# Patient Record
Sex: FEMALE | Race: WHITE | NOT HISPANIC OR LATINO | URBAN - METROPOLITAN AREA
[De-identification: names, ages, dates, MRNs, and addresses within clinical notes are randomized per-mention and may not be internally consistent; named-entity substitution may affect disease eponyms.]

---

## 2018-12-25 ENCOUNTER — EMERGENCY (EMERGENCY)
Facility: HOSPITAL | Age: 58
LOS: 0 days | Discharge: HOME | End: 2018-12-25
Attending: EMERGENCY MEDICINE | Admitting: EMERGENCY MEDICINE
Payer: COMMERCIAL

## 2018-12-25 VITALS
HEART RATE: 85 BPM | OXYGEN SATURATION: 94 % | TEMPERATURE: 98 F | RESPIRATION RATE: 16 BRPM | DIASTOLIC BLOOD PRESSURE: 71 MMHG | SYSTOLIC BLOOD PRESSURE: 158 MMHG

## 2018-12-25 VITALS — HEIGHT: 65 IN | WEIGHT: 160.06 LBS

## 2018-12-25 DIAGNOSIS — R05 COUGH: ICD-10-CM

## 2018-12-25 DIAGNOSIS — J02.9 ACUTE PHARYNGITIS, UNSPECIFIED: ICD-10-CM

## 2018-12-25 DIAGNOSIS — B34.9 VIRAL INFECTION, UNSPECIFIED: ICD-10-CM

## 2018-12-25 DIAGNOSIS — R50.9 FEVER, UNSPECIFIED: ICD-10-CM

## 2018-12-25 DIAGNOSIS — Z88.0 ALLERGY STATUS TO PENICILLIN: ICD-10-CM

## 2018-12-25 DIAGNOSIS — I10 ESSENTIAL (PRIMARY) HYPERTENSION: ICD-10-CM

## 2018-12-25 PROCEDURE — 99283 EMERGENCY DEPT VISIT LOW MDM: CPT

## 2018-12-25 RX ORDER — DEXAMETHASONE 0.5 MG/5ML
10 ELIXIR ORAL ONCE
Qty: 0 | Refills: 0 | Status: COMPLETED | OUTPATIENT
Start: 2018-12-25 | End: 2018-12-25

## 2018-12-25 RX ADMIN — Medication 10 MILLIGRAM(S): at 18:21

## 2018-12-25 NOTE — ED ADULT TRIAGE NOTE - CHIEF COMPLAINT QUOTE
c/o cough, sore throat x 10 days, reports right eye redness since today. Denies fever, chills, CP, SOB.

## 2018-12-25 NOTE — ED STATDOCS - OBJECTIVE STATEMENT
57 yo F hx of HTN, presents with CC cough.  Symptoms x 1 week.  C/o dry cough, subjective fevers, "lost my voice", sore throat.  Denies vomiting, diarrhea, myalgias.  Yesterday and today has also had bilateral red eyes, with itchy, watery discharge.  No sick contacts.  No other concerns.

## 2018-12-25 NOTE — ED ADULT NURSE NOTE - NSIMPLEMENTINTERV_GEN_ALL_ED
Implemented All Universal Safety Interventions:  [place] to call system. Call bell, personal items and telephone within reach. Instruct patient to call for assistance. Room bathroom lighting operational. Non-slip footwear when patient is off stretcher. Physically safe environment: no spills, clutter or unnecessary equipment. Stretcher in lowest position, wheels locked, appropriate side rails in place.

## 2019-02-27 ENCOUNTER — APPOINTMENT (OUTPATIENT)
Dept: DERMATOLOGY | Facility: CLINIC | Age: 59
End: 2019-02-27
Payer: COMMERCIAL

## 2019-02-27 VITALS — WEIGHT: 160 LBS | BODY MASS INDEX: 26.66 KG/M2 | HEIGHT: 65 IN

## 2019-02-27 DIAGNOSIS — L81.4 OTHER MELANIN HYPERPIGMENTATION: ICD-10-CM

## 2019-02-27 DIAGNOSIS — D22.9 MELANOCYTIC NEVI, UNSPECIFIED: ICD-10-CM

## 2019-02-27 DIAGNOSIS — D48.9 NEOPLASM OF UNCERTAIN BEHAVIOR, UNSPECIFIED: ICD-10-CM

## 2019-02-27 PROCEDURE — 11102 TANGNTL BX SKIN SINGLE LES: CPT

## 2019-02-27 PROCEDURE — 99203 OFFICE O/P NEW LOW 30 MIN: CPT | Mod: 25

## 2019-02-27 RX ORDER — SODIUM FLUORIDE 6 MG/ML
1.1 PASTE, DENTIFRICE DENTAL
Qty: 100 | Refills: 0 | Status: ACTIVE | COMMUNITY
Start: 2018-10-20

## 2019-02-27 RX ORDER — IBUPROFEN 600 MG/1
600 TABLET, FILM COATED ORAL
Qty: 20 | Refills: 0 | Status: DISCONTINUED | COMMUNITY
Start: 2018-11-01

## 2019-02-27 RX ORDER — CLINDAMYCIN HYDROCHLORIDE 150 MG/1
150 CAPSULE ORAL
Qty: 20 | Refills: 0 | Status: DISCONTINUED | COMMUNITY
Start: 2018-11-01

## 2019-02-27 RX ORDER — CHLORHEXIDINE GLUCONATE, 0.12% ORAL RINSE 1.2 MG/ML
0.12 SOLUTION DENTAL
Qty: 473 | Refills: 0 | Status: DISCONTINUED | COMMUNITY
Start: 2018-11-01

## 2019-02-27 RX ORDER — LISINOPRIL AND HYDROCHLOROTHIAZIDE TABLETS 20; 12.5 MG/1; MG/1
20-12.5 TABLET ORAL
Qty: 180 | Refills: 0 | Status: DISCONTINUED | COMMUNITY
Start: 2018-02-15

## 2019-02-27 RX ORDER — HYDROCODONE BITARTRATE AND HOMATROPINE METHYLBROMIDE 5; 1.5 MG/5ML; MG/5ML
5-1.5 SYRUP ORAL
Qty: 80 | Refills: 0 | Status: DISCONTINUED | COMMUNITY
Start: 2018-12-25

## 2019-02-27 NOTE — HISTORY OF PRESENT ILLNESS
[FreeTextEntry1] : growth on left cheek [de-identified] : patient with mole on left cheek. feels it was present but is now regressing.

## 2019-02-27 NOTE — ASSESSMENT
[FreeTextEntry1] : 1) benign findings as above- education\par \par 2) Shave bx location left cheek\par diagnosis: rule out IDN vs. halo nevus \par  \par Shave biopsy performed today over above location, risks and benefits discussed including incomplete removal, not enough tissue for diagnosis scarring and infection, informed consent obtained, pictures taken,  cleaned with alcohol and anesthetized with 1%lido+epi, 0.3 cc total, hemostasis obtained with monsels, vaseline and bandaid placed, tolerated well, wound care reviewed, specimen sent to pathology.\par \par

## 2019-02-27 NOTE — PHYSICAL EXAM
[FreeTextEntry3] : AAOx3, pleasant, NAD, no visual lymphadenopathy\par hair, scalp, face, nose, eyelids, ears, lips, oropharynx, neck, chest, abdomen, back, right arm, left arm, nails, and hands examined with all normal findings,\par pertinent findings include:\par \par multiple benign nevi and lentigines\par left cheek with hyperpigmented papule

## 2019-03-05 ENCOUNTER — MOBILE ON CALL (OUTPATIENT)
Age: 59
End: 2019-03-05

## 2019-03-12 LAB — CORE LAB BIOPSY: NORMAL

## 2021-12-31 ENCOUNTER — NON-APPOINTMENT (OUTPATIENT)
Age: 61
End: 2021-12-31

## 2022-03-25 ENCOUNTER — NON-APPOINTMENT (OUTPATIENT)
Age: 62
End: 2022-03-25

## 2022-03-25 ENCOUNTER — APPOINTMENT (OUTPATIENT)
Dept: FAMILY MEDICINE | Facility: CLINIC | Age: 62
End: 2022-03-25
Payer: COMMERCIAL

## 2022-03-25 ENCOUNTER — LABORATORY RESULT (OUTPATIENT)
Age: 62
End: 2022-03-25

## 2022-03-25 VITALS
HEIGHT: 65 IN | RESPIRATION RATE: 16 BRPM | TEMPERATURE: 96.2 F | DIASTOLIC BLOOD PRESSURE: 90 MMHG | OXYGEN SATURATION: 98 % | BODY MASS INDEX: 30.99 KG/M2 | WEIGHT: 186 LBS | HEART RATE: 80 BPM | SYSTOLIC BLOOD PRESSURE: 124 MMHG

## 2022-03-25 DIAGNOSIS — Z78.9 OTHER SPECIFIED HEALTH STATUS: ICD-10-CM

## 2022-03-25 DIAGNOSIS — Z82.0 FAMILY HISTORY OF EPILEPSY AND OTHER DISEASES OF THE NERVOUS SYSTEM: ICD-10-CM

## 2022-03-25 DIAGNOSIS — Z87.891 PERSONAL HISTORY OF NICOTINE DEPENDENCE: ICD-10-CM

## 2022-03-25 DIAGNOSIS — Z82.49 FAMILY HISTORY OF ISCHEMIC HEART DISEASE AND OTHER DISEASES OF THE CIRCULATORY SYSTEM: ICD-10-CM

## 2022-03-25 DIAGNOSIS — Z80.3 FAMILY HISTORY OF MALIGNANT NEOPLASM OF BREAST: ICD-10-CM

## 2022-03-25 DIAGNOSIS — Z86.32 PERSONAL HISTORY OF GESTATIONAL DIABETES: ICD-10-CM

## 2022-03-25 DIAGNOSIS — Z83.438 FAMILY HISTORY OF OTHER DISORDER OF LIPOPROTEIN METABOLISM AND OTHER LIPIDEMIA: ICD-10-CM

## 2022-03-25 PROCEDURE — 93000 ELECTROCARDIOGRAM COMPLETE: CPT | Mod: 59

## 2022-03-25 PROCEDURE — 99386 PREV VISIT NEW AGE 40-64: CPT | Mod: 25

## 2022-03-25 PROCEDURE — G0444 DEPRESSION SCREEN ANNUAL: CPT | Mod: 59

## 2022-03-25 PROCEDURE — 36415 COLL VENOUS BLD VENIPUNCTURE: CPT

## 2022-03-25 RX ORDER — BUPROPION HYDROCHLORIDE 150 MG/1
150 TABLET, EXTENDED RELEASE ORAL
Qty: 180 | Refills: 0 | Status: DISCONTINUED | COMMUNITY
Start: 2017-10-29 | End: 2022-03-25

## 2022-03-25 RX ORDER — SIMVASTATIN 10 MG/1
10 TABLET, FILM COATED ORAL
Qty: 30 | Refills: 0 | Status: DISCONTINUED | COMMUNITY
Start: 2018-10-27 | End: 2022-03-25

## 2022-03-25 NOTE — PLAN
[FreeTextEntry1] : Reviewed age-appropriate preventive screening tests with patient. UTD on mammogram. Due for gyn exam, DEXA, CRC screening (colonoscopy vs Cologuard) and agrees to consider these but is not ready to schedule at this time.  \par \par Shingrix, flu revd/recommended. She defers for now but will consider for the future.\par \par Check labs today. Continue same meds/doses pending results. BP goal is <=135/85 on average on home checks. \par \par Reviewed she should consider cardiology eval and testing given her cardiac RFs and as testing results would help to guide LDL goal for her. She will consider but defers for now. ECG show slow voltage (likely artifact due to breast tissue)\par \par Discussed clean eating (eg Mediterranean style eating plan) and regular exercise/staying as physically active as possible.  Include balance exercises and strength training and core strengthening exercises for bone health and to decrease risk for falls. \par \par Reviewed importance of good self care (eg meditation, yoga, adequate rest, regular exercise, magnesium, clean eating etc).\par \par r/b/se Alprazolam revd and recommended she cont to limit use to rare/occas as she has been. RF sent as requested. She does not feel she needs a daily med at this time but can let us know if this changes in the future\par \par Next CPE in 1 yr. RPA visit (chol, HTN etc) in 6 months and rpt fasting labs at that time.

## 2022-03-25 NOTE — HISTORY OF PRESENT ILLNESS
[de-identified] : Her last PE was in 5170-7746\par \par Her last tetanus shot was in 2014 when grandchild was born\par Shingrix- never\par Has had COVID vacc x 3 \par Has not had flu vacc this season\par \par Her last dentist visit was within past 6 months\par Her last eye doctor appointment was within past year\par Her last dermatologist visit was in 2019 Dr. Moore\par \par Her diet is clean/healthful overall\par Exercises regularly usually but is off track past few months due to busy life changes and will work to get back on track\par \par Her last colonoscopy was never, willing to do Cologuard at some time but not yet\par Her last mammogram was 1/2022 wnl per pt; has fibrocystic breasts all her life and it is stressful/anxiety provoking to do self breast exam so would like me to do breast exam at her visit today. \par Her last DEXA was never\par Her last gyn exam was in past year or two, not yet ready to repeat . \par \par Ms. Mccloud has h/o high cholesterol, HTN, anxiety/depression.\par \par She takes her meds consistently and tolerates them well. Checking BPs at home is very stressful so does not do. Was on simvastatin in the past but did not like taking statin med so stopped med and last couple of checks she thinks chol levels were good without sttain med\par \par She is doing well with clean eating but off track with her usual regular exercise routine due to various life stressors (daughter and grandchildren moved in with her  and her in past few mos).\par \par She had Echo several years ago and was wnl (due to change seen on ECG at PE exam ). Her daughter sees Dr. Blake Young for card eval. \par \par Anxiety/depression: Takes Buproprion SR form as prefers to take med BID and hopes to wean down over time. Takes ALprazolam rarely but needs RF as has only 1 pill left 12/2020/.

## 2022-03-25 NOTE — HEALTH RISK ASSESSMENT
[0] : 2) Feeling down, depressed, or hopeless: Not at all (0) [PHQ-2 Negative - No further assessment needed] : PHQ-2 Negative - No further assessment needed [[id number]] : 0

## 2022-03-25 NOTE — PHYSICAL EXAM
[No Acute Distress] : no acute distress [Well Developed] : well developed [Well-Appearing] : well-appearing [Normal Sclera/Conjunctiva] : normal sclera/conjunctiva [EOMI] : extraocular movements intact [Normal Outer Ear/Nose] : the outer ears and nose were normal in appearance [No JVD] : no jugular venous distention [No Lymphadenopathy] : no lymphadenopathy [Supple] : supple [Thyroid Normal, No Nodules] : the thyroid was normal and there were no nodules present [No Respiratory Distress] : no respiratory distress  [No Accessory Muscle Use] : no accessory muscle use [Normal Rate] : normal rate  [Clear to Auscultation] : lungs were clear to auscultation bilaterally [Regular Rhythm] : with a regular rhythm [Normal S1, S2] : normal S1 and S2 [No Carotid Bruits] : no carotid bruits [No Murmur] : no murmur heard [No Varicosities] : no varicosities [Pedal Pulses Present] : the pedal pulses are present [No Edema] : there was no peripheral edema [No Extremity Clubbing/Cyanosis] : no extremity clubbing/cyanosis [Soft] : abdomen soft [Non Tender] : non-tender [Non-distended] : non-distended [No HSM] : no HSM [Normal Bowel Sounds] : normal bowel sounds [Normal Posterior Cervical Nodes] : no posterior cervical lymphadenopathy [Normal Anterior Cervical Nodes] : no anterior cervical lymphadenopathy [No Joint Swelling] : no joint swelling [Grossly Normal Strength/Tone] : grossly normal strength/tone [No Rash] : no rash [Coordination Grossly Intact] : coordination grossly intact [No Focal Deficits] : no focal deficits [Normal Gait] : normal gait [Normal Affect] : the affect was normal [Normal Insight/Judgement] : insight and judgment were intact [Normal Appearance] : normal in appearance [No Masses] : no palpable masses [No Nipple Discharge] : no nipple discharge [No Axillary Lymphadenopathy] : no axillary lymphadenopathy [de-identified] : overweight, mildly anxious affect at start of visit but calmer/at ease by end of visit, good eye contact  [de-identified] : B pendulous breasts with fibrocystic changes symmetric and stable (this is her typical baseline exam), CBE done at her request today

## 2022-03-25 NOTE — REVIEW OF SYSTEMS
[Negative] : Heme/Lymph [de-identified] : h/o tick bites occas in past year, also her dog had Lyme disease and so did  in past year so she would like Lyme test with labs

## 2022-03-25 NOTE — ASSESSMENT
[FreeTextEntry1] : LONNIE LERMA is a 62 year old female here for a physical exam.  She is also here to follow up on medical issues as noted above.\par

## 2022-03-27 LAB
ALBUMIN SERPL ELPH-MCNC: 4.7 G/DL
ALP BLD-CCNC: 138 U/L
ALT SERPL-CCNC: 39 U/L
ANION GAP SERPL CALC-SCNC: 15 MMOL/L
AST SERPL-CCNC: 29 U/L
BILIRUB SERPL-MCNC: 0.3 MG/DL
BUN SERPL-MCNC: 29 MG/DL
CALCIUM SERPL-MCNC: 10.4 MG/DL
CHLORIDE SERPL-SCNC: 101 MMOL/L
CHOLEST SERPL-MCNC: 283 MG/DL
CO2 SERPL-SCNC: 24 MMOL/L
CREAT SERPL-MCNC: 1.26 MG/DL
EGFR: 48 ML/MIN/1.73M2
ESTIMATED AVERAGE GLUCOSE: 126 MG/DL
GLUCOSE SERPL-MCNC: 131 MG/DL
HBA1C MFR BLD HPLC: 6 %
HDLC SERPL-MCNC: 76 MG/DL
LDLC SERPL CALC-MCNC: 169 MG/DL
NONHDLC SERPL-MCNC: 207 MG/DL
POTASSIUM SERPL-SCNC: 4.9 MMOL/L
PROT SERPL-MCNC: 8 G/DL
SODIUM SERPL-SCNC: 140 MMOL/L
T4 FREE SERPL-MCNC: 0.9 NG/DL
TRIGL SERPL-MCNC: 188 MG/DL
TSH SERPL-ACNC: 3.14 UIU/ML

## 2022-03-30 ENCOUNTER — NON-APPOINTMENT (OUTPATIENT)
Age: 62
End: 2022-03-30

## 2022-09-11 ENCOUNTER — NON-APPOINTMENT (OUTPATIENT)
Age: 62
End: 2022-09-11

## 2022-09-13 ENCOUNTER — NON-APPOINTMENT (OUTPATIENT)
Age: 62
End: 2022-09-13

## 2022-09-14 ENCOUNTER — APPOINTMENT (OUTPATIENT)
Dept: FAMILY MEDICINE | Facility: CLINIC | Age: 62
End: 2022-09-14

## 2022-09-14 VITALS
TEMPERATURE: 97.2 F | WEIGHT: 186 LBS | HEIGHT: 65 IN | HEART RATE: 120 BPM | DIASTOLIC BLOOD PRESSURE: 78 MMHG | SYSTOLIC BLOOD PRESSURE: 180 MMHG | BODY MASS INDEX: 30.99 KG/M2 | OXYGEN SATURATION: 98 %

## 2022-09-14 VITALS — DIASTOLIC BLOOD PRESSURE: 70 MMHG | SYSTOLIC BLOOD PRESSURE: 144 MMHG

## 2022-09-14 VITALS — HEART RATE: 99 BPM

## 2022-09-14 DIAGNOSIS — J20.9 ACUTE BRONCHITIS, UNSPECIFIED: ICD-10-CM

## 2022-09-14 PROCEDURE — 99213 OFFICE O/P EST LOW 20 MIN: CPT

## 2022-09-14 NOTE — REVIEW OF SYSTEMS
[Fever] : no fever [Chills] : no chills [Fatigue] : fatigue [Hoarseness] : hoarseness [Nasal Discharge] : no nasal discharge [Sore Throat] : sore throat [Postnasal Drip] : no postnasal drip [Shortness Of Breath] : no shortness of breath [Wheezing] : wheezing [Cough] : cough [Dyspnea on Exertion] : no dyspnea on exertion [Negative] : Heme/Lymph

## 2022-09-14 NOTE — ASSESSMENT
[FreeTextEntry1] : Patient is a 61yo female presenting to the office complaining of persistent hacking cough.  Cough described as severe, dry, non-productive.  Denies CP/SOB.  Initially tachycardic in office 2/2 coughing fit she had but repeat improved to 99, BP improved.  No hypoxia.\par \par Bronchitis\par - Albuterol every 4-6 hours as needed for cough/wheeze/SOB.\par - Medrol dose pack.\par - Zithromax.\par - Hycodan PRN at night for cough.  Pt understands not to take Hycodan with Xanax.  Do not drink alcohol, drive, or operate heavy machinery when taking Hycodan as it causes drowsiness.\par - Continue mouth wash for oral lesions.\par - Supportive care including increased fluids, Ibuprofen/Acetaminophen as needed for pain/aches/fevers, OTC mucolytics(Mucinex or Robitussin)/nasal decongestants (Coricidin), nasal saline spray or Neti pot as needed.  Also recommend use of nasal steroid sprays (i.e. Flonase), Afrin (OTC decongestant spray) used SPARINGLY (not for more than 2-3 days in a row) can help decrease nasal congestion and help sinuses to drain.\par - Call the office or go to the ED immediately if you develop new, worsening or concerning symptoms including high fever, severe headache/worst headache of your life, confusion, dizziness/lightheadedness, loss of consciousness, severe chest pain, difficulty breathing, shortness of breath, severe abdominal pain, excessive vomiting/diarrhea, inability to feel/move the extremities, or any other concerning symptoms.\par

## 2022-09-14 NOTE — HISTORY OF PRESENT ILLNESS
[FreeTextEntry8] : Pt is a 61yo female presenting to the office complaining of cough.\par Pt states she had a cough x10 days, got better then got worse again for the past 7 days.\par Cough is persistent, dry, and non-productive.\par Pt states she hears a wheezing sound\par Pt states she has sore throat and lesions on the throat\par Pt denies cp or sob\par Denies fever\par Pt has tried Delsym with some improvement.\par Pt is not sleeping well at night because of symptoms.\par Pt's grandson was sick with ear infection, cough\par Pt denies known lung disease.  Pt is not a smoker (former, quit years ago).\par \par Pt seen at  yesterday, COVID negative.\par Pt given Tessalon Perles which is not improving the coughing and mouth wash which is improving the discomfort in the throat.

## 2022-09-14 NOTE — PHYSICAL EXAM
[Normal Outer Ear/Nose] : the outer ears and nose were normal in appearance [Normal TMs] : both tympanic membranes were normal [Regular Rhythm] : with a regular rhythm [No Murmur] : no murmur heard [No Edema] : there was no peripheral edema [Normal] : no rash [Coordination Grossly Intact] : coordination grossly intact [No Focal Deficits] : no focal deficits [Normal Gait] : normal gait [Normal Affect] : the affect was normal [Normal Insight/Judgement] : insight and judgment were intact [de-identified] : laryngitis [de-identified] : nasal mucosa mildly edematous, erythematous; pharynx erythematous with viral lesions in posterior oropharynx, no swelling, uvula midline, no exudates, tolerating oral secretions [de-identified] : end expiratory wheeze in all lung fields; good respiratory effort;  no tachypnea/dyspnea [de-identified] : borderline tachycardic

## 2022-09-15 NOTE — ED STATDOCS - PRIMARY CARE PROVIDER
Problem: Skin/Tissue Integrity  Goal: Absence of new skin breakdown  Description: 1. Monitor for areas of redness and/or skin breakdown  2. Assess vascular access sites hourly  3. Every 4-6 hours minimum:  Change oxygen saturation probe site  4. Every 4-6 hours:  If on nasal continuous positive airway pressure, respiratory therapy assess nares and determine need for appliance change or resting period.   Outcome: Progressing     Problem: Safety - Adult  Goal: Free from fall injury  Outcome: Progressing     Problem: ABCDS Injury Assessment  Goal: Absence of physical injury  Outcome: Progressing     Problem: Chronic Conditions and Co-morbidities  Goal: Patient's chronic conditions and co-morbidity symptoms are monitored and maintained or improved  Outcome: Progressing     Problem: Discharge Planning  Goal: Discharge to home or other facility with appropriate resources  Outcome: Progressing na

## 2022-09-19 ENCOUNTER — NON-APPOINTMENT (OUTPATIENT)
Age: 62
End: 2022-09-19

## 2022-09-27 ENCOUNTER — APPOINTMENT (OUTPATIENT)
Dept: FAMILY MEDICINE | Facility: CLINIC | Age: 62
End: 2022-09-27

## 2022-09-27 VITALS — SYSTOLIC BLOOD PRESSURE: 138 MMHG | DIASTOLIC BLOOD PRESSURE: 76 MMHG

## 2022-09-27 VITALS
WEIGHT: 192 LBS | BODY MASS INDEX: 31.99 KG/M2 | TEMPERATURE: 98 F | SYSTOLIC BLOOD PRESSURE: 148 MMHG | HEIGHT: 65 IN | OXYGEN SATURATION: 98 % | DIASTOLIC BLOOD PRESSURE: 80 MMHG | HEART RATE: 82 BPM

## 2022-09-27 DIAGNOSIS — G57.11 MERALGIA PARESTHETICA, RIGHT LOWER LIMB: ICD-10-CM

## 2022-09-27 DIAGNOSIS — J30.2 OTHER SEASONAL ALLERGIC RHINITIS: ICD-10-CM

## 2022-09-27 PROCEDURE — 99214 OFFICE O/P EST MOD 30 MIN: CPT

## 2022-09-27 RX ORDER — METHYLPREDNISOLONE 4 MG/1
4 TABLET ORAL
Qty: 1 | Refills: 0 | Status: DISCONTINUED | COMMUNITY
Start: 2022-09-14 | End: 2022-09-27

## 2022-09-27 RX ORDER — HYDROCODONE BITARTRATE AND HOMATROPINE METHYLBROMIDE 1.5; 5 MG/5ML; MG/5ML
5-1.5 SOLUTION ORAL
Qty: 120 | Refills: 0 | Status: DISCONTINUED | COMMUNITY
Start: 2022-09-14 | End: 2022-09-27

## 2022-09-27 RX ORDER — AZITHROMYCIN 250 MG/1
250 TABLET, FILM COATED ORAL
Qty: 1 | Refills: 0 | Status: DISCONTINUED | COMMUNITY
Start: 2022-09-14 | End: 2022-09-27

## 2022-09-27 NOTE — PHYSICAL EXAM
[No Acute Distress] : no acute distress [Well Developed] : well developed [Well-Appearing] : well-appearing [Normal Sclera/Conjunctiva] : normal sclera/conjunctiva [EOMI] : extraocular movements intact [Normal Outer Ear/Nose] : the outer ears and nose were normal in appearance [No JVD] : no jugular venous distention [No Lymphadenopathy] : no lymphadenopathy [Supple] : supple [Thyroid Normal, No Nodules] : the thyroid was normal and there were no nodules present [No Respiratory Distress] : no respiratory distress  [No Accessory Muscle Use] : no accessory muscle use [Clear to Auscultation] : lungs were clear to auscultation bilaterally [Normal Rate] : normal rate  [Regular Rhythm] : with a regular rhythm [Normal S1, S2] : normal S1 and S2 [No Murmur] : no murmur heard [No Carotid Bruits] : no carotid bruits [No Varicosities] : no varicosities [Pedal Pulses Present] : the pedal pulses are present [No Edema] : there was no peripheral edema [No Extremity Clubbing/Cyanosis] : no extremity clubbing/cyanosis [Normal Appearance] : normal in appearance [No Nipple Discharge] : no nipple discharge [Soft] : abdomen soft [Non Tender] : non-tender [Non-distended] : non-distended [No Masses] : no abdominal mass palpated [No HSM] : no HSM [Normal Bowel Sounds] : normal bowel sounds [Normal Posterior Cervical Nodes] : no posterior cervical lymphadenopathy [Normal Anterior Cervical Nodes] : no anterior cervical lymphadenopathy [No Joint Swelling] : no joint swelling [Grossly Normal Strength/Tone] : grossly normal strength/tone [No Rash] : no rash [Coordination Grossly Intact] : coordination grossly intact [No Focal Deficits] : no focal deficits [Normal Gait] : normal gait [Normal Affect] : the affect was normal [Normal Insight/Judgement] : insight and judgment were intact [Normal Oropharynx] : the oropharynx was normal [No Spinal Tenderness] : no spinal tenderness [de-identified] : overweight, mildly anxious affect, nontoxic appearance, +occas cough, +partial laryngitis, no resp distress [de-identified] : +mild angular chelitis noted, +tacky MM [de-identified] : lungs CTA with regular breathing and with cough there are a few harsh upper airway/tracheal BS but no w/c/r, good AE [de-identified] : 5/5 BUE/LE motor strength

## 2022-09-27 NOTE — ASSESSMENT
[FreeTextEntry1] : LONNIE LERMA is a 62 year old female here for follow up on medical issues as noted above.\par \par She is here to follow up on hypercholesterolemia, hypertension, impaired fasting glucose, and hypothyroidism. She also has residual post infectious cough and partial laryngitis from recent bronchitis/resp infection (lungs CTA today and no evidence of bronchitis or pneumonia at this time; revd post infectious cough can last up to 8-12 weeks but as long as sxs are slowly improving resolving and no new signif sxs she can continue to give it a bit more time) and intermittent right anterolateral thigh dysesthesias c/w likely meralgia paraesthetica

## 2022-09-27 NOTE — REVIEW OF SYSTEMS
[Negative] : Heme/Lymph [Hoarseness] : hoarseness [Earache] : no earache [Nasal Discharge] : no nasal discharge [Sore Throat] : no sore throat [Postnasal Drip] : no postnasal drip [FreeTextEntry4] : Cracking at edges of lips and dry mouth always (does not drink much water), +residual partial laryngitis/post nasal drip from recent resp infection  [FreeTextEntry9] : numbness/itchiness R anterolat thigh on and off over past year  [de-identified] : Had recent yeast skin infection under L breast and used OTC Lotrimin cream and worked well and sxs now resolved. We disc ok to use this as needed for any recurrent sxs\par Had recent yeast skin infection under L breast and used OTC Lotrimin cream and worked well and sxs now resolved. We disc ok to use this as needed for any recurrent sxs\par Had recent yeast skin infection under L breast and used OTC Lotrimin cream and worked well and sxs now resolved. We disc ok to use this as needed for any recurrent sxs

## 2022-09-27 NOTE — PLAN
[FreeTextEntry1] : \par Revd/recommended she Start trial of B complex or B12 suppl for her angular chelitis. Hydrate. Aquaphor liberally to lips/skin as needed. \par \par Diagnosis, common environmental causes, treatment options for environmental/seasonal allergies reviewed. Reviewed environmental control measures (keep windows closed, put on AC, shower/rinse off after being outdoors, pillow/mattress covers, HEPA type filters etc), OTC oral antihistamine +/- oral decongestant if tolerated, nasal saline flushes/Neti Pot, +/- can add Mucinex, +/- can add Singulair (Montelukast) to find what combination of meds works best.\par \par In the future for fall seasons use Flonase/OTC antihistamine consistently through season to try to avoid bronchitis episode if possible, ok to keep  Albuterol on hand for prn use but she should try to limit use as much as poss. Can consider adding Montelukast as needed in future fall seasons if needs addtl help. \par \par Also revd supp care measures for URIs/colds and black elderberry syrup for prophylactic or use when has sxs, rosalee as she has two young children living in her home \par \par Continue all medications as prescribed. We disc checking labs today or in near future but she states she prefers to defer on labs this time around but she agrees to sched CPE for the first several months of 2023/by Spring 2023 and we will check labs then. . Will adjust any medications based upon lab results.\par \par Initial BP elevated but she gets anxious in medical settings. BP improved once she felt calmer. BP goal is <=135/85 on average on home checks. Advised to let us know if BPs are above goal on home checks. \par \par LDL goal <=100 ideally. Reviewed LDL goal and ASCVD risk estimate and factors that go into this calculation.  Reviewed risks/benefits of statin med. Reviewed red yeast rice RYR (600-1200 mg daily) risks/benefits as an alternative to statin meds if not ready to consider statin meds. Recommended excellent hydration +/- co Q10 (100-400 mg daily) to decrease risk for statin (or RYR) related myalgias. \par \par Reviewed she should consider cardiology eval and testing given her cardiac RFs and as testing results would help to guide LDL goal for her. She will consider doing this in the next year or two but does not feel needs to do right now\par \par Reviewed r/b/se Alprazolam and recommended she cont to limit use to rare/occas as she has been. RF sent as requested\par \par Discussed clean eating (eg Mediterranean style eating plan) and regular exercise/staying as physically active as possible.  Include balance exercises and strength training and core strengthening exercises for bone health and to decrease risk for falls. Add yoga/pilates and/or stretches for low back and hip flexors to help with the meralgia paresthetica; jose maria//RTO if incr/new sxs or if this does not help enough as can consider PT. \par \par Reviewed importance of good self care (e.g. meditation, yoga, adequate rest, regular exercise, magnesium, clean eating, etc.). \par \par Next CPE and RPA visit in 6 months and recheck fasting labs then.

## 2022-09-27 NOTE — HISTORY OF PRESENT ILLNESS
[FreeTextEntry1] : LONNIE LERMA is a 62 year old female here for a follow up visit.\par  [de-identified] : Ms. Mccloud has hypercholesterolemia, hypertension, impaired fasting glucose, and hypothyroidism, and seasonal allergies (mainly in Fall). She is also recovering from recent resp infection/bronchitis and has residual cough and partial layngitis\par \par She takes her meds consistently and tolerates them well. Checking BPs at home is very stressful so does not do. Was on simvastatin in the past but did not like taking statin med so stopped med. , , ASCVD 5.38% in 3/2022. We disc having her see card nonurgently but she did not yet get a chance to consider that given busy life. Will consider doing this in next year or two or sooner if any cardiac sxs ever develop\par \par She had Echo several years ago and was wnl (due to change seen on ECG at PE exam). Her daughter sees Dr. Blake Young for card eval. \par \par Anxiety/depression: Takes Buproprion SR form as prefers to take med BID and hopes to wean down over time and is starting to do this recently. Takes Alprazolam rarely and med is effective and well tolerated when needed. \par \par She is here today for eval of persistent cough. Overall it is improving but as is still present she wanted to get lungs rechecked to see if needs to do any addtl treatment. 9/12/22 she went to urgent care with cough for several days. +canker sores. Urgent care gave med for canker sore and Rx for benzonatate but these did not help her. Saw Chris 9/14/22 and had exam c/w bronchitis. Given Azithromycin and generic hycodan, Medrol dose pack, Albuterol MDI. These meds all helped her to feel significantly better. Still with partial laryngitis and cough. Still with lingering nonproductive cough. No fevers. no SOB. no purulent sputum. No chest tightness. \par \par Also has h/o allergies. Mainly in Fall. Uses Benadryl when needed. Has FLonase though has not used recently. Albuterol prn helps so would like RF to keep on hand for prn occas use. . \par \par Has had R anter thigh intermittent itching and numbness.Worse after walking more. No back pain. No weakness. Ellipitical and walking are her main exercises. No  stretching/yoga/pilates etc. \par \par She has been a bit off track with clean eating due to daughter and two young grandsons living with her so she prefers to defer on labs for now.

## 2022-12-18 ENCOUNTER — NON-APPOINTMENT (OUTPATIENT)
Age: 62
End: 2022-12-18

## 2023-01-05 ENCOUNTER — RX RENEWAL (OUTPATIENT)
Age: 63
End: 2023-01-05

## 2023-01-10 ENCOUNTER — NON-APPOINTMENT (OUTPATIENT)
Age: 63
End: 2023-01-10

## 2023-07-25 DIAGNOSIS — E03.8 OTHER SPECIFIED HYPOTHYROIDISM: ICD-10-CM

## 2023-07-31 ENCOUNTER — LABORATORY RESULT (OUTPATIENT)
Age: 63
End: 2023-07-31

## 2023-07-31 ENCOUNTER — APPOINTMENT (OUTPATIENT)
Dept: FAMILY MEDICINE | Facility: CLINIC | Age: 63
End: 2023-07-31
Payer: COMMERCIAL

## 2023-07-31 VITALS
WEIGHT: 197 LBS | SYSTOLIC BLOOD PRESSURE: 124 MMHG | TEMPERATURE: 98.1 F | HEART RATE: 72 BPM | OXYGEN SATURATION: 98 % | BODY MASS INDEX: 32.82 KG/M2 | HEIGHT: 65 IN | DIASTOLIC BLOOD PRESSURE: 88 MMHG

## 2023-07-31 DIAGNOSIS — Z00.00 ENCOUNTER FOR GENERAL ADULT MEDICAL EXAMINATION W/OUT ABNORMAL FINDINGS: ICD-10-CM

## 2023-07-31 DIAGNOSIS — M79.604 PAIN IN RIGHT LEG: ICD-10-CM
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
## 2023-07-31 PROCEDURE — 99396 PREV VISIT EST AGE 40-64: CPT | Mod: 25

## 2023-07-31 PROCEDURE — 99213 OFFICE O/P EST LOW 20 MIN: CPT | Mod: 25

## 2023-07-31 PROCEDURE — 36415 COLL VENOUS BLD VENIPUNCTURE: CPT

## 2023-07-31 RX ORDER — LISINOPRIL AND HYDROCHLOROTHIAZIDE TABLETS 20; 12.5 MG/1; MG/1
20-12.5 TABLET ORAL
Qty: 90 | Refills: 3 | Status: ACTIVE | COMMUNITY
Start: 2022-03-29 | End: 1900-01-01

## 2023-07-31 NOTE — PHYSICAL EXAM
[No Acute Distress] : no acute distress [Well Nourished] : well nourished [Well Developed] : well developed [Well-Appearing] : well-appearing [Normal Sclera/Conjunctiva] : normal sclera/conjunctiva [PERRL] : pupils equal round and reactive to light [EOMI] : extraocular movements intact [Normal Outer Ear/Nose] : the outer ears and nose were normal in appearance [Normal Oropharynx] : the oropharynx was normal [No JVD] : no jugular venous distention [No Lymphadenopathy] : no lymphadenopathy [Supple] : supple [Thyroid Normal, No Nodules] : the thyroid was normal and there were no nodules present [Normal Rate] : normal rate  [Regular Rhythm] : with a regular rhythm [Normal S1, S2] : normal S1 and S2 [No Murmur] : no murmur heard [No Carotid Bruits] : no carotid bruits [No Varicosities] : no varicosities [Pedal Pulses Present] : the pedal pulses are present [No Edema] : there was no peripheral edema [No Extremity Clubbing/Cyanosis] : no extremity clubbing/cyanosis [Normal Appearance] : normal in appearance [No Nipple Discharge] : no nipple discharge [Soft] : abdomen soft [Non Tender] : non-tender [Non-distended] : non-distended [No Masses] : no abdominal mass palpated [No HSM] : no HSM [Normal Bowel Sounds] : normal bowel sounds [Normal Posterior Cervical Nodes] : no posterior cervical lymphadenopathy [Normal Anterior Cervical Nodes] : no anterior cervical lymphadenopathy [No Spinal Tenderness] : no spinal tenderness [No Joint Swelling] : no joint swelling [Grossly Normal Strength/Tone] : grossly normal strength/tone [No Rash] : no rash [Coordination Grossly Intact] : coordination grossly intact [No Focal Deficits] : no focal deficits [Normal Gait] : normal gait [Normal] : normal gait, coordination grossly intact, no focal deficits and deep tendon reflexes were 2+ and symmetric [Normal Affect] : the affect was normal [Normal Insight/Judgement] : insight and judgment were intact [Normal Supraclavicular Nodes] : no supraclavicular lymphadenopathy [Normal Axillary Nodes] : no axillary lymphadenopathy [de-identified] : obesity BMI 32.7, incr from last visit, +anxious affect, good eye contact  [de-identified] : pendulous breasts, fibrocystic changes OQ B symmetric, No masses, no nipple changes or abnormalities and no skin changes [de-identified] : 5/5 BUE/LE motor strength , +TTP over medial R distal quad insertion points

## 2023-07-31 NOTE — HEALTH RISK ASSESSMENT
[0] : 2) Feeling down, depressed, or hopeless: Not at all (0) [PHQ-2 Negative - No further assessment needed] : PHQ-2 Negative - No further assessment needed [10-14] : 10-14 [Former] : Former [> 15 Years] : > 15 Years [[id number]] : 0

## 2023-07-31 NOTE — HISTORY OF PRESENT ILLNESS
[FreeTextEntry1] : LONNIE LERMA is a 63 year old female here for a physical exam. [de-identified] : Her last physical exam was last year  Vaccines: Tetanus is up to date, in 2014 per pt Shingrix is NOT up to date COVID vaccine is up to date  Her last dentist visit was less than one year ago Her last eye doctor appointment was less than one year ago Her last dermatologist visit was less than one year ago, Dr. Moore  GYN visit is NOT up to date, several yrs ago, Dr. Mazariegos [facility] Mammogram is NOT up to date, 1/2022 wnl per pt, has fibrocystic breasts all her life and it is stressful/anxiety provoking to do self breast exam so would like me to do breast exam at her visit today. Colon cancer screening is NOT up to date, no screening ever done DEXA is NOT up to date, never  Her diet is healthy overall Exercise: josefina Campbell has high cholesterol, IFG, HTN and anxiety.   She prefers to use Bupropion SR form as prefers to take med BID than daily and wants option to wean down in future if/when desired. Has Alprazolam on hand for prn use and limits use to rare/occas and med is effective and well tolerated when used.   At 3/2022 PE visit , , HDL 76, . 10 yr ASCVD risk estimated at 5.38%. A1C 6%. She prefers to not take statin and has deferred on seeing card at this time and confirms these are still her preferences.   3 tick bites in past several mos. No sxs. Would like Lyme test with labs   R distal qaud (where tendons insert to knee) pain and soreness x months, improving over time. Worse with stairs and getting out of chairs. Massaging area helps. Does not feel needs to see ortho or do PT at this time but will let me know if wants to. We disc adding strength training for quads/glutes/hamstrings and she will try

## 2023-07-31 NOTE — REVIEW OF SYSTEMS
[Negative] : Heme/Lymph [Joint Pain] : joint pain [Joint Stiffness] : joint stiffness [Muscle Pain] : muscle pain [Fever] : no fever [Chills] : no chills [Fatigue] : no fatigue [Night Sweats] : no night sweats [Recent Change In Weight] : ~T recent weight change [FreeTextEntry9] : R distal quad soreness, see HPI, some OA related joint pain and stiffness that is manageable and feels better with walking  [de-identified] :  Had recent yeast skin infection under L breast and used OTC Lotrimin cream and worked well and sxs now resolved. We disc ok to use this as needed for any recurrent sxs

## 2023-07-31 NOTE — PLAN
[FreeTextEntry1] : Continue all medications as prescribed. Check labs as above. Will adjust any medications based upon lab results.  Reviewed age-appropriate preventive screening tests with patient. She is due for gyn exam, mammogram, DEXA and CRC screening and agrees to do gyn exam, mammo and DEXA but is not yet ready to do CRC screening. She defers on doing    CRC     at this time and will let me know if/when she changes her mind and wishes to proceed.  Reviewed pros/cons of Cologuard and Colonoscopy. She will check insurance coverage and prefers to start with Cologuard test and will let me know when to order it but states she is not yet ready to proceed with this  Revd/recommended Shingrix series.  BP goal is <=135/85 on average on home checks. Advised to let us know if BPs are above goal on home checks.   Lifestyle measures to optimize BP reviewed, including regular exercise, adequate sleep, Mediterranean or DASH style clean eating, mindfulness/meditation, magnesium 100-400 mg supplementation, avoid NSAIDS, stress management techniques etc.     LDL goal <=100 ideally. Reviewed LDL goal and ASCVD risk estimate and factors that go into this calculation.  Reviewed risks/benefits of statin med. Reviewed red yeast rice RYR (600-1200 mg daily) risks/benefits as an alternative to statin meds if not ready to consider statin meds. Recommended excellent hydration +/- co Q10 (100-400 mg daily) to decrease risk for statin (or RYR) related myalgias.    Reviewed diagnosis of impaired fasting glucose (pre-diabetes) and risk for progression to diabetes. Reviewed dietary/lifestyle measures that can help to improve glucose and A1C levels over time and decrease risk for progression to diabetes, including eating cleanly (eg Mediterranean style eating plan), limiting/avoiding white flour carbohydrates and added sugars/sweeteners, pairing proteins with carbohydrates, higher fiber intake in diet, exercising regularly including cardio and strength training, achieving and maintaining a normal/near normal weight/BMI etc. We will monitor glucose and HgbA1C trends over time.   Revd option to see card for eval/testing given cardiac RF s (eg age/post menopausal status, HTN. Chol etc) as that information could help to clarify LDL goal, treatment plan for her high cholesterol. She still prefers to defer on card eval and testing at this time but will let me know if changes her mind or if ever any cardiac sxs.   Discussed clean eating (eg Mediterranean style eating plan) and regular exercise/staying as physically active as possible.  Include balance exercises and strength training and core strengthening exercises for bone health and to decrease risk for falls. Recommend special focus on strengthening quads/hams/glutes and also use foam roller/massage for the quad tendon soreness  Recommended Tylenol XS or Arthritis 1-2 pills BID-TID if helpful, ok to use NSAIDs sparingly with food (but revd r/b/se of NSAIDs incl CV, renal and GI and she should limit use as much as possible), regular stretching, heat/ice prn, consider turmeric supplementation, consider CBD cream or oral options, gentle yoga/chair yoga, Pilates, strengthen core muscles, consider chiro and/or massage and/or acupuncture. If these measures are not helpful enough then consider consultation with ortho and/or pain  for further eval and treatment.  Reviewed importance of good self care (e.g. meditation, yoga, adequate rest, regular exercise, magnesium, clean eating, etc.).  Revd r/b/se benzodiazepine meds and that these are intended to be used rarely/occasionally only and not regularly. Revd need to safeguard supply of med. If freq of need for med increases signif over time and is more than rare/occas pt should let us know and would consider trial of daily med (eg SSRI) at that time.   Follow up with specialists as recommended by them.   Follow up for next physical in one year. RPA visit (HTN, chol etc) and repeat fasting labs in about 6 months.  Additional time spent addressing new or existing problems, requiring additional work outside of the normal scope of a routine annual exam: 20 minutes.

## 2023-07-31 NOTE — ASSESSMENT
[FreeTextEntry1] : LONNIE LERMA is a 63 year old female here for a physical exam.  She is also here to follow up on medical issues as noted above.

## 2023-08-01 DIAGNOSIS — A69.20 LYME DISEASE, UNSPECIFIED: ICD-10-CM

## 2023-08-01 LAB
ALBUMIN SERPL ELPH-MCNC: 4.4 G/DL
ALP BLD-CCNC: 208 U/L
ALT SERPL-CCNC: 32 U/L
ANION GAP SERPL CALC-SCNC: 13 MMOL/L
AST SERPL-CCNC: 27 U/L
BASOPHILS # BLD AUTO: 0.08 K/UL
BASOPHILS NFR BLD AUTO: 0.7 %
BILIRUB SERPL-MCNC: 0.3 MG/DL
BUN SERPL-MCNC: 23 MG/DL
CALCIUM SERPL-MCNC: 9.9 MG/DL
CHLORIDE SERPL-SCNC: 99 MMOL/L
CHOLEST SERPL-MCNC: 241 MG/DL
CO2 SERPL-SCNC: 26 MMOL/L
CREAT SERPL-MCNC: 1.06 MG/DL
EGFR: 59 ML/MIN/1.73M2
EOSINOPHIL # BLD AUTO: 0.65 K/UL
EOSINOPHIL NFR BLD AUTO: 5.6 %
ESTIMATED AVERAGE GLUCOSE: 140 MG/DL
GLUCOSE SERPL-MCNC: 126 MG/DL
HBA1C MFR BLD HPLC: 6.5 %
HCT VFR BLD CALC: 42.5 %
HDLC SERPL-MCNC: 58 MG/DL
HGB BLD-MCNC: 12.8 G/DL
IMM GRANULOCYTES NFR BLD AUTO: 0.3 %
LDLC SERPL CALC-MCNC: 152 MG/DL
LYMPHOCYTES # BLD AUTO: 4.29 K/UL
LYMPHOCYTES NFR BLD AUTO: 36.8 %
MAN DIFF?: NORMAL
MCHC RBC-ENTMCNC: 25 PG
MCHC RBC-ENTMCNC: 30.1 GM/DL
MCV RBC AUTO: 83.2 FL
MONOCYTES # BLD AUTO: 0.65 K/UL
MONOCYTES NFR BLD AUTO: 5.6 %
NEUTROPHILS # BLD AUTO: 5.97 K/UL
NEUTROPHILS NFR BLD AUTO: 51 %
NONHDLC SERPL-MCNC: 183 MG/DL
PLATELET # BLD AUTO: 468 K/UL
POTASSIUM SERPL-SCNC: 4 MMOL/L
PROT SERPL-MCNC: 7.8 G/DL
RBC # BLD: 5.11 M/UL
RBC # FLD: 19.1 %
SODIUM SERPL-SCNC: 138 MMOL/L
TRIGL SERPL-MCNC: 171 MG/DL
WBC # FLD AUTO: 11.67 K/UL

## 2023-08-01 RX ORDER — ALBUTEROL SULFATE 90 UG/1
108 (90 BASE) INHALANT RESPIRATORY (INHALATION)
Qty: 18 | Refills: 2 | Status: ACTIVE | COMMUNITY
Start: 2022-09-14 | End: 1900-01-01

## 2023-08-01 RX ORDER — ALPRAZOLAM 0.25 MG/1
0.25 TABLET ORAL
Qty: 30 | Refills: 0 | Status: ACTIVE | COMMUNITY
Start: 2018-11-09 | End: 1900-01-01

## 2023-08-02 ENCOUNTER — NON-APPOINTMENT (OUTPATIENT)
Age: 63
End: 2023-08-02

## 2023-11-17 ENCOUNTER — APPOINTMENT (OUTPATIENT)
Dept: INTERNAL MEDICINE | Facility: CLINIC | Age: 63
End: 2023-11-17

## 2024-04-20 DIAGNOSIS — R73.01 IMPAIRED FASTING GLUCOSE: ICD-10-CM

## 2024-04-20 RX ORDER — DOXYCYCLINE HYCLATE 100 MG/1
100 CAPSULE ORAL
Qty: 28 | Refills: 0 | Status: DISCONTINUED | COMMUNITY
Start: 2023-08-02 | End: 2024-04-20

## 2024-04-22 ENCOUNTER — APPOINTMENT (OUTPATIENT)
Dept: FAMILY MEDICINE | Facility: CLINIC | Age: 64
End: 2024-04-22
Payer: COMMERCIAL

## 2024-04-22 VITALS
HEIGHT: 65 IN | HEART RATE: 94 BPM | TEMPERATURE: 97.9 F | WEIGHT: 193 LBS | BODY MASS INDEX: 32.15 KG/M2 | OXYGEN SATURATION: 97 %

## 2024-04-22 VITALS — DIASTOLIC BLOOD PRESSURE: 72 MMHG | SYSTOLIC BLOOD PRESSURE: 142 MMHG

## 2024-04-22 VITALS — SYSTOLIC BLOOD PRESSURE: 134 MMHG | DIASTOLIC BLOOD PRESSURE: 62 MMHG

## 2024-04-22 DIAGNOSIS — F41.9 ANXIETY DISORDER, UNSPECIFIED: ICD-10-CM

## 2024-04-22 DIAGNOSIS — E78.00 PURE HYPERCHOLESTEROLEMIA, UNSPECIFIED: ICD-10-CM

## 2024-04-22 DIAGNOSIS — I10 ESSENTIAL (PRIMARY) HYPERTENSION: ICD-10-CM

## 2024-04-22 DIAGNOSIS — R79.89 OTHER SPECIFIED ABNORMAL FINDINGS OF BLOOD CHEMISTRY: ICD-10-CM

## 2024-04-22 DIAGNOSIS — Z12.39 ENCOUNTER FOR OTHER SCREENING FOR MALIGNANT NEOPLASM OF BREAST: ICD-10-CM

## 2024-04-22 DIAGNOSIS — E11.9 TYPE 2 DIABETES MELLITUS W/OUT COMPLICATIONS: ICD-10-CM

## 2024-04-22 PROCEDURE — G2211 COMPLEX E/M VISIT ADD ON: CPT | Mod: NC,1L

## 2024-04-22 PROCEDURE — 99215 OFFICE O/P EST HI 40 MIN: CPT

## 2024-04-22 NOTE — REVIEW OF SYSTEMS
[Recent Change In Weight] : ~T recent weight change [Joint Pain] : joint pain [Joint Stiffness] : joint stiffness [Muscle Pain] : muscle pain [Negative] : Heme/Lymph [Fever] : no fever [Chills] : no chills [Fatigue] : no fatigue [Night Sweats] : no night sweats [FreeTextEntry2] : +wgt gain [FreeTextEntry9] : R distal quad soreness, see HPI, some OA related joint pain and stiffness that is manageable and feels better with walking  [de-identified] :  Had recent yeast skin infection under L breast and used OTC Lotrimin cream and worked well and sxs now resolved. We disc ok to use this as needed for any recurrent sxs

## 2024-04-22 NOTE — HEALTH RISK ASSESSMENT
[No falls in past year] : Patient reported no falls in the past year [0] : 2) Feeling down, depressed, or hopeless: Not at all (0) [PHQ-2 Negative - No further assessment needed] : PHQ-2 Negative - No further assessment needed [Former] : Former [10-14] : 10-14 [> 15 Years] : > 15 Years [[id number]] : 0

## 2024-04-22 NOTE — PHYSICAL EXAM
[No Acute Distress] : no acute distress [Well Nourished] : well nourished [Well-Appearing] : well-appearing [Well Developed] : well developed [Normal Sclera/Conjunctiva] : normal sclera/conjunctiva [EOMI] : extraocular movements intact [Normal Outer Ear/Nose] : the outer ears and nose were normal in appearance [Normal Oropharynx] : the oropharynx was normal [No JVD] : no jugular venous distention [No Lymphadenopathy] : no lymphadenopathy [Supple] : supple [Thyroid Normal, No Nodules] : the thyroid was normal and there were no nodules present [Normal Rate] : normal rate  [Regular Rhythm] : with a regular rhythm [Normal S1, S2] : normal S1 and S2 [No Murmur] : no murmur heard [No Carotid Bruits] : no carotid bruits [No Varicosities] : no varicosities [Pedal Pulses Present] : the pedal pulses are present [No Edema] : there was no peripheral edema [No Extremity Clubbing/Cyanosis] : no extremity clubbing/cyanosis [Soft] : abdomen soft [Non Tender] : non-tender [Non-distended] : non-distended [No HSM] : no HSM [Normal Bowel Sounds] : normal bowel sounds [Normal Supraclavicular Nodes] : no supraclavicular lymphadenopathy [Normal Axillary Nodes] : no axillary lymphadenopathy [Normal Anterior Cervical Nodes] : no anterior cervical lymphadenopathy [Normal Posterior Cervical Nodes] : no posterior cervical lymphadenopathy [No Spinal Tenderness] : no spinal tenderness [No Joint Swelling] : no joint swelling [Grossly Normal Strength/Tone] : grossly normal strength/tone [No Rash] : no rash [Coordination Grossly Intact] : coordination grossly intact [No Focal Deficits] : no focal deficits [Normal Gait] : normal gait [Normal] : normal gait, coordination grossly intact, no focal deficits and deep tendon reflexes were 2+ and symmetric [Normal Insight/Judgement] : insight and judgment were intact [Normal Appearance] : normal in appearance [No Masses] : no palpable masses [No Nipple Discharge] : no nipple discharge [No Axillary Lymphadenopathy] : no axillary lymphadenopathy [de-identified] : obesity BMI 32, though wgt decr by 4# since 7/2023 visit, +anxious affect, good eye contact  [de-identified] : B breasts are large, with B symmetric UOQ fibrocystic changes , no masses, no axillary LA, no nipple retraction/discharge [de-identified] : 5/5 BUE/LE motor strength  [de-identified] : +anxious affect

## 2024-04-22 NOTE — PLAN
[FreeTextEntry1] : Continue all medications as prescribed. Check labs as above incl will check iron studies, vit B12, Alk Phos fractionation given abnormal WBC, plts, Alk Phos on last labs); she is not fasting today so will RTO in near future . Will adjust any medications based upon lab results.  She defers on ECG today and states will do at her PE visit (we did not get ECG at PE visit 8/2023 due to technical issues). Has no cardiac sxs.   Revd/recommended Shingrix series.  Due for gyn exam and mammogram and recommended both. She finds these exams to be traumatic/anxiety inducing and is working to get to the point where she feels emotionally ready to do these exams.   BP goal is <=135/85 on average on home checks. Advised to let us know if BPs are above goal on home checks.  Lifestyle measures to optimize BP reviewed, including regular exercise, adequate sleep, Mediterranean or DASH style clean eating, mindfulness/meditation, magnesium 100-400 mg supplementation, avoid NSAIDS, stress management techniques etc.  LDL goal <=100 ideally. Reviewed LDL goal and ASCVD risk estimate and factors that go into this calculation. Reviewed risks/benefits of statin med. Reviewed red yeast rice RYR (600-1200 mg daily) risks/benefits as an alternative to statin meds if not ready to consider statin meds. Recommended excellent hydration +/- co Q10 (100-400 mg daily) to decrease risk for statin (or RYR) related myalgias. Also consider use of psyllium husk fiber supplement.   Based on her most recent LDL and 10 yr CV risk level and new DM revd that she should be on statin medication. Also revd and recommended that she see card nonurgently for eval/testing given cardiac RF s (eg age/post menopausal status, DM, HTN , chol etc) as that information could help to clarify LDL goal, treatment plan for her high cholesterol. She still prefers to defer on card eval and testing at this time but will let me know if changes her mind or if ever any cardiac sxs.  Reviewed diabetes treatment plan including Mediterranean style/more plant based/clean eating, pair proteins with carbs for better glycemic control, regular exercise (strength training as well as cardio exercise, and try to do brisk walking after larger meals for better glycemic control), work to achieve/maintain a health weight, need for annual ophthalmology exam and good foot care (self care or with podiatrist if needed), Goal LDL <100, goal A1c <=7%, BP goal <=130-135/80-85 on average. Most patients with diabetes should be on an ACE-I or ARB med for renal protection, and a statin medication for primary CV risk reduction.   Discussed clean eating (eg Mediterranean style plant based eating plan) and regular exercise/staying as physically active as possible. Include balance exercises and strength training and core strengthening exercises for bone health and to decrease risk for falls. Recommend special focus on strengthening quads/hams/glutes and also use foam roller/massage for the quad tendon soreness  Recommended Tylenol XS or Arthritis 1-2 pills BID-TID if helpful, ok to use NSAIDs sparingly with food (but revd r/b/se of NSAIDs incl CV, renal and GI and she should limit use as much as possible), regular stretching, heat/ice prn, consider turmeric supplementation, consider CBD cream or oral options, gentle yoga/chair yoga, Pilates, strengthen core muscles, consider chiro and/or massage and/or acupuncture. If these measures are not helpful enough then consider consultation with ortho and/or pain  for further eval and treatment.  Reviewed importance of good self care (e.g. meditation, yoga, adequate rest, regular exercise, magnesium, clean eating, etc.).  Revd r/b/se benzodiazepine meds and that these are intended to be used rarely/occasionally only and not regularly. Revd need to safeguard supply of med. If freq of need for med increases signif over time and is more than rare/occas pt should let us know and would consider trial of daily med (eg SSRI) at that time.  Follow up with specialists as recommended by them.  Follow up for CPE and RPA visit (HTN, chol, DM etc) and repeat fasting labs in about 6 months.  Face-to-face time spent with patient, over half in discussion of the above diagnoses and treatment plan: 40 minutes.

## 2024-04-22 NOTE — HISTORY OF PRESENT ILLNESS
[FreeTextEntry1] : LONNIE LERMA is a 64 year old female here for a follow up visit. [de-identified] : Shannan has high cholesterol, IFG vs. DM, HTN and anxiety. She also had elevated WBC and plts and Alk Phos on labs in Summer 2023 PE visit and we are following up on these abnormal labs today.   She prefers to use Bupropion SR form as prefers to take med BID than daily and wants option to wean down in future if/when desired. Has Alprazolam on hand for prn use and limits use to rare/occas and med is effective and well tolerated when used.  Labs Aug 2023 showed , , HDL 58, , Glu 126. A1C 6.5%= new dx of Type 2 DM. 10 yr ASCVD risk est around 12%. Alk phos was elevated at 208, WBc/Plts elevated, GFR borderline low at 59 and Lyme test positive. She was Rx'ed a course of Doxycycline and completed this.   Statin therapy was recommended/offered. She deferred. She was advised to work on  eating, exercise and follow up in 3-4 months. She is returning to office today for follow up visit and labs. Given her new DM her 10 yr ASCVD is elevated around 12% and statin therapy is indicated for her.   She had been having more sugar in her day to day eating around time of last labs but when she heard about her labs she cut out added sugars and concentrated sweets. She is hoping her labs will have improved from DM range back to IFG range at least   She has been doing exercise including walking. Has not added strength training consistently though did so some srength training with [employer] and enjoyed that.   Would like breast exam today. No new breast concerns but she has had many years of intermittent breast soreness and has cystic breasts and this makes doing self breast exams difficult. Has not had gyn visit in a while and is overdue for mammogram (last in 2022) and is working up the courage to schedule both . Does not feel needs to see a breast surgeon at this time. No skin changes/nipple/retraction/discharge etc. Disc consideration of reduction mammoplasty; she does not feel needs to consider this at this time though will let me know if ever changes her mind

## 2024-04-22 NOTE — ASSESSMENT
[FreeTextEntry1] : LONNIE LERMA is a 64 year old female here for follow up on medical issues as noted above.

## 2024-06-10 ENCOUNTER — RX CHANGE (OUTPATIENT)
Age: 64
End: 2024-06-10

## 2024-06-10 RX ORDER — BUPROPION HYDROCHLORIDE 200 MG/1
200 TABLET, FILM COATED, EXTENDED RELEASE ORAL
Qty: 180 | Refills: 3 | Status: DISCONTINUED | COMMUNITY
Start: 2022-03-25 | End: 2024-06-10

## 2024-06-10 RX ORDER — METOPROLOL SUCCINATE 50 MG/1
50 TABLET, EXTENDED RELEASE ORAL
Qty: 90 | Refills: 3 | Status: ACTIVE | COMMUNITY
Start: 1900-01-01 | End: 1900-01-01

## 2024-06-10 RX ORDER — BUPROPION HYDROCHLORIDE 200 MG/1
200 TABLET, FILM COATED, EXTENDED RELEASE ORAL
Qty: 180 | Refills: 3 | Status: ACTIVE | COMMUNITY
Start: 1900-01-01 | End: 1900-01-01

## 2024-06-10 RX ORDER — METOPROLOL SUCCINATE 50 MG/1
50 TABLET, EXTENDED RELEASE ORAL
Qty: 90 | Refills: 3 | Status: DISCONTINUED | COMMUNITY
Start: 2018-02-15 | End: 2024-06-10

## 2024-10-30 ENCOUNTER — APPOINTMENT (OUTPATIENT)
Dept: FAMILY MEDICINE | Facility: CLINIC | Age: 64
End: 2024-10-30

## 2025-01-15 ENCOUNTER — APPOINTMENT (OUTPATIENT)
Dept: FAMILY MEDICINE | Facility: CLINIC | Age: 65
End: 2025-01-15